# Patient Record
Sex: FEMALE | Race: BLACK OR AFRICAN AMERICAN | NOT HISPANIC OR LATINO | Employment: UNEMPLOYED | URBAN - METROPOLITAN AREA
[De-identification: names, ages, dates, MRNs, and addresses within clinical notes are randomized per-mention and may not be internally consistent; named-entity substitution may affect disease eponyms.]

---

## 2022-11-28 ENCOUNTER — HOSPITAL ENCOUNTER (OUTPATIENT)
Dept: DATA CONVERSION | Facility: HOSPITAL | Age: 27
End: 2022-11-28
Attending: STUDENT IN AN ORGANIZED HEALTH CARE EDUCATION/TRAINING PROGRAM

## 2022-11-28 DIAGNOSIS — Z34.80 ENCOUNTER FOR SUPERVISION OF OTHER NORMAL PREGNANCY, UNSPECIFIED TRIMESTER (HHS-HCC): ICD-10-CM

## 2022-11-28 DIAGNOSIS — Z3A.32 32 WEEKS GESTATION OF PREGNANCY (HHS-HCC): ICD-10-CM

## 2022-11-28 DIAGNOSIS — L73.2 HIDRADENITIS SUPPURATIVA: ICD-10-CM

## 2022-11-28 DIAGNOSIS — O99.891 OTHER SPECIFIED DISEASES AND CONDITIONS COMPLICATING PREGNANCY (HHS-HCC): ICD-10-CM

## 2023-03-01 NOTE — PROGRESS NOTES
Current Stage:   Stage: Triage     OB Dating:   EDC/EGA:  ·  EGA 30.3     Subjective Data:   Antepartum:  Vaginal Bleeding: No   Contractions/Abdominal Pain: No   Discharge/Loss of Fluid: No   Fetal Movement: Good   Fevers/Chills: No   Preeclampsia Symptoms: No   Antepartum:    Imani presents today with worsening pain from a HS lesion on her left labia.  She reports a longstanding history of this and is followed by Dr. Tillman in derm  She denies any obstetrical complaints today.  Reports that the lesion has got more painful despite using warm compresses, lidocaine gel in the area.      Objective Information:    Objective Information:      T   P  R  BP   MAP  SpO2   Value  36.8  85  16  118/68   86  97%  Date/Time  11:41  12:36  11:41  11:41   11:41  12:36  Range  (36.8C - 36.8C )  (85 - 104 )  (16 - 16 )  (118 - 118 )/ (68 - 68 )  (86 - 86 )  (97% - 100% )      Pain reported at  12:17: 10 = Severe      Physical Exam:   Constitutional: alert, oriented   Obstetric:  MOD variability + accels, neg  decels  toco - quiet  left labia with tender swollen area approx 3-4cm round.  No exudate.  Old HS lesion present and healing well bilateral labia   Skin: no rashes or lesions      Testing:   NST Interpretation - Baby A:  ·  Baseline    ·  Variability moderate (amplitude range 6 to 25 bpm)   ·  Interpretation Appropriate for EGA (2 10x10 accels)   ·  Accelerations yes (appropriate)   ·  Decelerations none     Assessment and Plan:   Assessment:    #1 IUP @ 32 weeks       Reactive NST    #2 HS       MD to evaluate and decide between incising on L&D or sending to derm for follow up    GARCIA Felix      Electronic Signatures:  Maya Felix (LAURITA-ODESSA)  (Signed 2022 13:15)   Authored: Current Stage, OB Dating, Subjective Data,  Objective Data,  Testing, Assessment and Plan, Note Completion      Last Updated: 2022 13:15 by Maya Felix  (APRN-CNM)

## 2023-08-16 LAB
HEPATITIS B VIRUS SURFACE AG PRESENCE IN SERUM: NONREACTIVE
HEPATITIS C VIRUS AB PRESENCE IN SERUM: NONREACTIVE
HIV 1/ 2 AG/AB SCREEN: NONREACTIVE
SYPHILIS TOTAL AB: NONREACTIVE

## 2023-08-17 LAB
CHLAMYDIA TRACH., AMPLIFIED: NEGATIVE
CLUE CELLS: ABNORMAL
N. GONORRHEA, AMPLIFIED: NEGATIVE
NUGENT SCORE: 7
TRICHOMONAS VAGINALIS: POSITIVE
YEAST: ABNORMAL

## 2023-09-06 VITALS — HEIGHT: 65 IN | WEIGHT: 189.6 LBS | BODY MASS INDEX: 31.59 KG/M2

## 2023-10-14 ENCOUNTER — HOSPITAL ENCOUNTER (EMERGENCY)
Facility: HOSPITAL | Age: 28
Discharge: HOME | End: 2023-10-14
Attending: EMERGENCY MEDICINE
Payer: COMMERCIAL

## 2023-10-14 VITALS
RESPIRATION RATE: 18 BRPM | SYSTOLIC BLOOD PRESSURE: 108 MMHG | BODY MASS INDEX: 34.49 KG/M2 | TEMPERATURE: 97.9 F | HEART RATE: 75 BPM | WEIGHT: 207 LBS | DIASTOLIC BLOOD PRESSURE: 64 MMHG | OXYGEN SATURATION: 99 % | HEIGHT: 65 IN

## 2023-10-14 DIAGNOSIS — L03.115 CELLULITIS OF RIGHT LOWER EXTREMITY: Primary | ICD-10-CM

## 2023-10-14 PROCEDURE — 2500000004 HC RX 250 GENERAL PHARMACY W/ HCPCS (ALT 636 FOR OP/ED): Mod: SE

## 2023-10-14 PROCEDURE — 99283 EMERGENCY DEPT VISIT LOW MDM: CPT

## 2023-10-14 PROCEDURE — 99283 EMERGENCY DEPT VISIT LOW MDM: CPT | Performed by: EMERGENCY MEDICINE

## 2023-10-14 RX ORDER — SULFAMETHOXAZOLE AND TRIMETHOPRIM 800; 160 MG/1; MG/1
1 TABLET ORAL 2 TIMES DAILY
Qty: 28 TABLET | Refills: 0 | Status: SHIPPED | OUTPATIENT
Start: 2023-10-14 | End: 2023-10-28

## 2023-10-14 RX ORDER — SULFAMETHOXAZOLE AND TRIMETHOPRIM 800; 160 MG/1; MG/1
1 TABLET ORAL ONCE
Status: COMPLETED | OUTPATIENT
Start: 2023-10-14 | End: 2023-10-14

## 2023-10-14 RX ORDER — ACETAMINOPHEN 325 MG/1
650 TABLET ORAL ONCE
Status: COMPLETED | OUTPATIENT
Start: 2023-10-14 | End: 2023-10-14

## 2023-10-14 RX ADMIN — ACETAMINOPHEN 650 MG: 325 TABLET ORAL at 14:48

## 2023-10-14 RX ADMIN — SULFAMETHOXAZOLE AND TRIMETHOPRIM 1 TABLET: 800; 160 TABLET ORAL at 14:48

## 2023-10-14 ASSESSMENT — PAIN SCALES - GENERAL: PAINLEVEL_OUTOF10: 9

## 2023-10-14 ASSESSMENT — LIFESTYLE VARIABLES
HAVE YOU EVER FELT YOU SHOULD CUT DOWN ON YOUR DRINKING: NO
REASON UNABLE TO ASSESS: NO
EVER HAD A DRINK FIRST THING IN THE MORNING TO STEADY YOUR NERVES TO GET RID OF A HANGOVER: NO
HAVE PEOPLE ANNOYED YOU BY CRITICIZING YOUR DRINKING: NO
EVER FELT BAD OR GUILTY ABOUT YOUR DRINKING: NO

## 2023-10-14 ASSESSMENT — COLUMBIA-SUICIDE SEVERITY RATING SCALE - C-SSRS
2. HAVE YOU ACTUALLY HAD ANY THOUGHTS OF KILLING YOURSELF?: NO
1. IN THE PAST MONTH, HAVE YOU WISHED YOU WERE DEAD OR WISHED YOU COULD GO TO SLEEP AND NOT WAKE UP?: NO
6. HAVE YOU EVER DONE ANYTHING, STARTED TO DO ANYTHING, OR PREPARED TO DO ANYTHING TO END YOUR LIFE?: NO

## 2023-10-14 ASSESSMENT — PAIN - FUNCTIONAL ASSESSMENT: PAIN_FUNCTIONAL_ASSESSMENT: 0-10

## 2023-10-14 ASSESSMENT — PAIN DESCRIPTION - PROGRESSION: CLINICAL_PROGRESSION: NOT CHANGED

## 2023-10-14 NOTE — DISCHARGE INSTRUCTIONS
May apply warm compresses to the area.  Follow-up with dermatology [phone] in 48-72 hours.   Continue Ibuprofen/ tylenol for discomfort.

## 2023-10-14 NOTE — ED PROVIDER NOTES
Emergency Department Encounter  [facility] EMERGENCY MEDICINE    Patient: Imani Marcial  MRN: [id number]  : 1995  Date of Evaluation: 10/14/2023  ED Provider: BUCK Adams      Chief Complaint       Chief Complaint   Patient presents with    Abscess     [place]    (Location/Symptom, Timing/Onset, Context/Setting, Quality, Duration, Modifying Factors, Severity) Note limiting factors.   Limitations to History: None  Historian: Patient  Records reviewed: EMR inpatient and outpatient notes, Care Everywhere      Imani Marcial is a 28 y.o. female who presents to the emergency department complaining of boil to right inner thigh for 3 days.  She reports history of hidradenitis.  She reports minimal relief with ibuprofen 800 mg.  She denies fever, chills, body aches, chest pain, drainage, discoloration in skin, numbness or tingling.    ROS:     Review of Systems  14 systems reviewed and otherwise acutely negative except as in the [place].          Past History     Past Medical History:   Diagnosis Date    Encounter for immunization 2022    Need for vaccination    Encounter for screening for malignant neoplasm of cervix 10/29/2018    Cervical cancer screening    Other conditions influencing health status 2022    History of pregnancy    Other specified noninflammatory disorders of vagina 2021    Vaginal discharge     History reviewed. No pertinent surgical history.  Social History     Socioeconomic History    Marital status: Single     Spouse name: None    Number of children: None    Years of education: None    Highest education level: None   Occupational History    None   Tobacco Use    Smoking status: None    Smokeless tobacco: None   Substance and Sexual Activity    Alcohol use: None    Drug use: None    Sexual activity: None   Other Topics Concern    None   Social History Narrative    None     Social Determinants of Health     Financial Resource Strain: Not on file   Food  Insecurity: Not on file   Transportation Needs: Not on file   Physical Activity: Not on file   Stress: Not on file   Social Connections: Not on file   Intimate Partner Violence: Not on file   Housing Stability: Not on file       Medications/Allergies     Previous Medications    No medications on file     No Known Allergies     Physical Exam       ED Triage Vitals [10/14/23 1245]   Temp Heart Rate Resp BP   36.6 °C (97.9 °F) 75 18 108/64      SpO2 Temp Source Heart Rate Source Patient Position   99 % Temporal -- Sitting      BP Location FiO2 (%)     Right arm --         Physical Exam  Vitals and nursing note reviewed.   Constitutional:       Appearance: Normal appearance.   HENT:      Head: Normocephalic.      Right Ear: External ear normal.      Left Ear: External ear normal.      Nose: Nose normal.   Eyes:      Extraocular Movements: Extraocular movements intact.      Pupils: Pupils are equal, round, and reactive to light.   Cardiovascular:      Rate and Rhythm: Normal rate and regular rhythm.      Heart sounds: Normal heart sounds.   Pulmonary:      Effort: Pulmonary effort is normal.      Breath sounds: Normal breath sounds.   Abdominal:      General: Abdomen is flat. Bowel sounds are normal.      Palpations: Abdomen is soft.   Musculoskeletal:         General: Normal range of motion.      Cervical back: Normal range of motion and neck supple.   Skin:     Comments: Hardened nodule to right inner thigh. There is a moderate degree of tenderness, erythema and localized swelling to surrounding skin. No fluctuance. No tenderness extending beyond the area of erythema. No ascending lymphangitis. No other rash. No bullae or hemorrhagic bullae. No crepitance. No suggestion of necrotizing soft tissue infection such as necrotizing fasciitis. No ecchymosis.       Neurological:      General: No focal deficit present.      Mental Status: She is alert and oriented to person, place, and time.   Psychiatric:         Mood and  "Affect: Mood normal.         Behavior: Behavior normal.           Diagnostics   Labs:  Labs Reviewed - No data to display  Radiographs:  No orders to display       Procedures: N/A      EKG: N/A  Assessment   In brief, Imani Marcial is a 28 y.o. female who presented to the emergency department for chief complaint of boil to right inner thigh for 3 days.  Vital signs reviewed and within normal limits.  Afebrile.  Hydrated and nontoxic appearing.  No acute distress noted. Physical exam shows hardened nodule to right inner thigh.  There is a moderate degree of tenderness, erythema and localized swelling to surrounding skin. No fluctuance. No tenderness extending beyond the area of erythema. No ascending lymphangitis. No other rash. No bullae or hemorrhagic bullae. No crepitance. No suggestion of necrotizing soft tissue infection such as necrotizing fasciitis. No ecchymosis.  Differential diagnosis includes cellulitis, abscess, folliculitis.  Case reviewed with Dr. Carlos Sharma.  No evidence of necrotizing fasciitis, abscess or sepsis.  Will treat for cellulitis.  Bactrim and acetaminophen administered.  Home-going with Bactrim.  Patient educated to continue continue Bactrim and supportive care measures.  Patient educated on ibuprofen/Tylenol for discomfort.  May apply warm compresses to the affected area.  Patient educated to follow-up with dermatology as needed.  Patient verbalized understanding, agreed with plan of care and left in stable condition.    Plan   Cellulitis    Differentials   Cellulitis  Abscess  Folliculitis  Necrotizing fasciitis    ED Course   Visit Vitals  /64 (BP Location: Right arm, Patient Position: Sitting)   Pulse 75   Temp 36.6 °C (97.9 °F) (Temporal)   Resp 18   Ht 1.651 m (5' 5\")   Wt 93.9 kg (207 lb)   SpO2 99%   BMI 34.45 kg/m²   BSA 2.08 m²       Medications - No data to display    Plan of care discussed with patient      Final Impression    No diagnosis found.  "     DISPOSITION  Disposition: Discharge  Patient condition is: Stable    Comment: Please note this report has been produced using speech recognition software and may contain errors related to that system including errors in grammar, punctuation, and spelling, as well as words and phrases that may be inappropriate.  If there are any questions or concerns please feel free to contact the dictating provider for clarification.    LAURITA Adams-BUCK Jaramillo  10/14/23 5736

## 2024-03-10 ENCOUNTER — HOSPITAL ENCOUNTER (EMERGENCY)
Facility: HOSPITAL | Age: 29
Discharge: HOME | End: 2024-03-10
Attending: EMERGENCY MEDICINE
Payer: COMMERCIAL

## 2024-03-10 ENCOUNTER — APPOINTMENT (OUTPATIENT)
Dept: RADIOLOGY | Facility: HOSPITAL | Age: 29
End: 2024-03-10
Payer: COMMERCIAL

## 2024-03-10 VITALS
OXYGEN SATURATION: 96 % | SYSTOLIC BLOOD PRESSURE: 114 MMHG | HEIGHT: 65 IN | HEART RATE: 109 BPM | WEIGHT: 190 LBS | TEMPERATURE: 98.1 F | DIASTOLIC BLOOD PRESSURE: 64 MMHG | RESPIRATION RATE: 16 BRPM | BODY MASS INDEX: 31.65 KG/M2

## 2024-03-10 DIAGNOSIS — L73.2 HIDRADENITIS SUPPURATIVA OF ANUS: Primary | ICD-10-CM

## 2024-03-10 LAB
ANION GAP SERPL CALC-SCNC: 15 MMOL/L (ref 10–20)
APPEARANCE UR: CLEAR
BILIRUB UR STRIP.AUTO-MCNC: NEGATIVE MG/DL
BUN SERPL-MCNC: 10 MG/DL (ref 6–23)
CALCIUM SERPL-MCNC: 9.7 MG/DL (ref 8.6–10.6)
CHLORIDE SERPL-SCNC: 103 MMOL/L (ref 98–107)
CO2 SERPL-SCNC: 26 MMOL/L (ref 21–32)
COLOR UR: YELLOW
CREAT SERPL-MCNC: 0.75 MG/DL (ref 0.5–1.05)
EGFRCR SERPLBLD CKD-EPI 2021: >90 ML/MIN/1.73M*2
GLUCOSE SERPL-MCNC: 97 MG/DL (ref 74–99)
GLUCOSE UR STRIP.AUTO-MCNC: NORMAL MG/DL
HOLD SPECIMEN: NORMAL
KETONES UR STRIP.AUTO-MCNC: NEGATIVE MG/DL
LEUKOCYTE ESTERASE UR QL STRIP.AUTO: NEGATIVE
MUCOUS THREADS #/AREA URNS AUTO: ABNORMAL /LPF
NITRITE UR QL STRIP.AUTO: NEGATIVE
PH UR STRIP.AUTO: 6.5 [PH]
POTASSIUM SERPL-SCNC: 4 MMOL/L (ref 3.5–5.3)
PREGNANCY TEST URINE, POC: NEGATIVE
PROT UR STRIP.AUTO-MCNC: ABNORMAL MG/DL
RBC # UR STRIP.AUTO: ABNORMAL /UL
RBC #/AREA URNS AUTO: ABNORMAL /HPF
SODIUM SERPL-SCNC: 140 MMOL/L (ref 136–145)
SP GR UR STRIP.AUTO: 1.03
SQUAMOUS #/AREA URNS AUTO: ABNORMAL /HPF
UROBILINOGEN UR STRIP.AUTO-MCNC: NORMAL MG/DL
WBC #/AREA URNS AUTO: ABNORMAL /HPF

## 2024-03-10 PROCEDURE — 73080 X-RAY EXAM OF ELBOW: CPT | Mod: LT

## 2024-03-10 PROCEDURE — 99285 EMERGENCY DEPT VISIT HI MDM: CPT | Performed by: EMERGENCY MEDICINE

## 2024-03-10 PROCEDURE — 73060 X-RAY EXAM OF HUMERUS: CPT | Mod: LT

## 2024-03-10 PROCEDURE — 70486 CT MAXILLOFACIAL W/O DYE: CPT | Performed by: RADIOLOGY

## 2024-03-10 PROCEDURE — 70498 CT ANGIOGRAPHY NECK: CPT

## 2024-03-10 PROCEDURE — 76377 3D RENDER W/INTRP POSTPROCES: CPT | Mod: CCI

## 2024-03-10 PROCEDURE — 81001 URINALYSIS AUTO W/SCOPE: CPT | Performed by: EMERGENCY MEDICINE

## 2024-03-10 PROCEDURE — 73130 X-RAY EXAM OF HAND: CPT | Mod: LEFT SIDE | Performed by: INTERNAL MEDICINE

## 2024-03-10 PROCEDURE — 99285 EMERGENCY DEPT VISIT HI MDM: CPT | Mod: 25

## 2024-03-10 PROCEDURE — 73060 X-RAY EXAM OF HUMERUS: CPT | Mod: LEFT SIDE | Performed by: INTERNAL MEDICINE

## 2024-03-10 PROCEDURE — 70486 CT MAXILLOFACIAL W/O DYE: CPT

## 2024-03-10 PROCEDURE — 73110 X-RAY EXAM OF WRIST: CPT | Mod: LT

## 2024-03-10 PROCEDURE — 2500000001 HC RX 250 WO HCPCS SELF ADMINISTERED DRUGS (ALT 637 FOR MEDICARE OP): Mod: SE

## 2024-03-10 PROCEDURE — 73110 X-RAY EXAM OF WRIST: CPT | Mod: LEFT SIDE | Performed by: INTERNAL MEDICINE

## 2024-03-10 PROCEDURE — 2550000001 HC RX 255 CONTRASTS: Mod: SE | Performed by: EMERGENCY MEDICINE

## 2024-03-10 PROCEDURE — 82374 ASSAY BLOOD CARBON DIOXIDE: CPT | Performed by: EMERGENCY MEDICINE

## 2024-03-10 PROCEDURE — 73130 X-RAY EXAM OF HAND: CPT | Mod: LT

## 2024-03-10 PROCEDURE — 81025 URINE PREGNANCY TEST: CPT

## 2024-03-10 PROCEDURE — 36415 COLL VENOUS BLD VENIPUNCTURE: CPT | Performed by: EMERGENCY MEDICINE

## 2024-03-10 PROCEDURE — 73080 X-RAY EXAM OF ELBOW: CPT | Mod: LEFT SIDE | Performed by: INTERNAL MEDICINE

## 2024-03-10 RX ORDER — DOXYCYCLINE HYCLATE 100 MG
100 TABLET ORAL ONCE
Status: COMPLETED | OUTPATIENT
Start: 2024-03-10 | End: 2024-03-10

## 2024-03-10 RX ORDER — DOXYCYCLINE 100 MG/1
100 TABLET ORAL 2 TIMES DAILY
Qty: 60 TABLET | Refills: 0 | Status: SHIPPED | OUTPATIENT
Start: 2024-03-10 | End: 2024-03-25 | Stop reason: ALTCHOICE

## 2024-03-10 RX ORDER — ACETAMINOPHEN 325 MG/1
975 TABLET ORAL ONCE
Status: COMPLETED | OUTPATIENT
Start: 2024-03-10 | End: 2024-03-10

## 2024-03-10 RX ADMIN — IOHEXOL 75 ML: 350 INJECTION, SOLUTION INTRAVENOUS at 13:27

## 2024-03-10 RX ADMIN — ACETAMINOPHEN 975 MG: 325 TABLET ORAL at 11:49

## 2024-03-10 RX ADMIN — DOXYCYCLINE HYCLATE 100 MG: 100 TABLET, COATED ORAL at 11:49
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
## 2024-03-10 NOTE — DISCHARGE INSTRUCTIONS
We strongly recommend following up with [facility] and they are always available in the ED if you have any similar concerns in the future.  Otherwise we recommend prophylactic antibiotics for concern for hidradenitis and would recommend taking the doxycycline as prescribed until you follow-up with your primary care provider.  If swelling continues, you develop fevers, chills, generalized infection or the pain becomes unbearable these would all be reasons to return to ED for consideration of drainage.  Otherwise we do not routinely recommend drainage of hidradenitis as it causes increased rates of recurrence.  We would recommend following with dermatology for further management of hidradenitis as they have options such as local steroids, wide excisions and antiandrogens that are sometimes helpful in the treatment.  You otherwise do not have any evidence of acute traumatic injuries on imaging today.  Does not mean they were on the [place] pain and would recommend treatment at home with acetaminophen, ibuprofen, rest and gentle use.  Please return if you develop new symptoms.

## 2024-03-14 ENCOUNTER — OFFICE VISIT (OUTPATIENT)
Dept: PRIMARY CARE | Facility: CLINIC | Age: 29
End: 2024-03-14
Payer: COMMERCIAL

## 2024-03-14 VITALS
OXYGEN SATURATION: 97 % | HEART RATE: 85 BPM | BODY MASS INDEX: 32.01 KG/M2 | TEMPERATURE: 97.3 F | HEIGHT: 65 IN | DIASTOLIC BLOOD PRESSURE: 65 MMHG | SYSTOLIC BLOOD PRESSURE: 103 MMHG | WEIGHT: 192.1 LBS

## 2024-03-14 DIAGNOSIS — F17.210 CIGARETTE NICOTINE DEPENDENCE WITHOUT COMPLICATION: Primary | ICD-10-CM

## 2024-03-14 DIAGNOSIS — Z30.019 ENCOUNTER FOR FEMALE BIRTH CONTROL: ICD-10-CM

## 2024-03-14 DIAGNOSIS — G47.33 OBSTRUCTIVE SLEEP APNEA OF ADULT: ICD-10-CM

## 2024-03-14 DIAGNOSIS — Z23 ENCOUNTER FOR IMMUNIZATION: ICD-10-CM

## 2024-03-14 PROBLEM — L73.2 HIDRADENITIS SUPPURATIVA: Status: ACTIVE | Noted: 2018-11-07

## 2024-03-14 PROBLEM — F41.8 DEPRESSION WITH ANXIETY: Status: ACTIVE | Noted: 2024-03-14

## 2024-03-14 PROBLEM — E55.9 VITAMIN D DEFICIENCY: Status: ACTIVE | Noted: 2024-03-14

## 2024-03-14 PROCEDURE — 90471 IMMUNIZATION ADMIN: CPT

## 2024-03-14 PROCEDURE — 4004F PT TOBACCO SCREEN RCVD TLK: CPT

## 2024-03-14 PROCEDURE — 90677 PCV20 VACCINE IM: CPT

## 2024-03-14 PROCEDURE — 99214 OFFICE O/P EST MOD 30 MIN: CPT

## 2024-03-14 RX ORDER — BENZOYL PEROXIDE 100 MG/ML
1 LIQUID TOPICAL DAILY
COMMUNITY

## 2024-03-14 RX ORDER — MICONAZOLE NITRATE 2 %
2 CREAM (GRAM) TOPICAL AS NEEDED
Qty: 100 EACH | Refills: 0 | Status: SHIPPED | OUTPATIENT
Start: 2024-03-14 | End: 2024-04-13

## 2024-03-14 RX ORDER — NORELGESTROMIN AND ETHINYL ESTRADIOL 150; 35 UG/D; UG/D
1 PATCH TRANSDERMAL
Qty: 4 PATCH | Refills: 2 | Status: SHIPPED | OUTPATIENT
Start: 2024-03-14 | End: 2024-06-12

## 2024-03-14 RX ORDER — BUPROPION HYDROCHLORIDE 150 MG/1
150 TABLET ORAL
COMMUNITY
Start: 2024-02-19

## 2024-03-14 RX ORDER — GENTAMICIN SULFATE 1 MG/G
1 OINTMENT TOPICAL 2 TIMES DAILY
COMMUNITY
Start: 2024-02-15

## 2024-03-14 RX ORDER — NORELGESTROMIN AND ETHINYL ESTRADIOL 150; 35 UG/D; UG/D
1 PATCH TRANSDERMAL
COMMUNITY
Start: 2013-01-03 | End: 2024-03-14 | Stop reason: SDUPTHER

## 2024-03-14 ASSESSMENT — PAIN SCALES - GENERAL: PAINLEVEL: 0-NO PAIN
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
## 2024-03-16 ASSESSMENT — ENCOUNTER SYMPTOMS
NAUSEA: 0
DYSURIA: 0
PALPITATIONS: 0
VOMITING: 0
FATIGUE: 1
CHEST TIGHTNESS: 1
DIARRHEA: 0
SLEEP DISTURBANCE: 1
CONSTIPATION: 0
MUSCULOSKELETAL NEGATIVE: 1
HEADACHES: 1
TROUBLE SWALLOWING: 0
FEVER: 0
INSOMNIA: 1
WHEEZING: 0
CHILLS: 0
DIAPHORESIS: 0
COUGH: 0
UNEXPECTED WEIGHT CHANGE: 0

## 2024-03-18 NOTE — PROGRESS NOTES
I saw and evaluated the patient. I personally obtained the key and critical portions of the history and physical exam or was physically present for key and critical portions performed by the resident/fellow. I reviewed the resident/fellow's documentation and discussed the patient with the resident/fellow. I agree with the resident/fellow's medical decision making as documented in the note.    Danny Guillen MD

## 2024-03-24 NOTE — PROGRESS NOTES
Patient: Imani Marcial    [id number]  : 1995 -- AGE 28 y.o.    Provider: Rod Hall MD     Location [facility]   Service Date: 3/24/2024              [facility]  New Visit Note      Virtual or Telephone Consent  An interactive audio and video telecommunication system which permits real time communications between the patient (at the originating site) and provider (at the distant site) was utilized to provide this telehealth service.   Verbal consent was requested and obtained from Imani Marcial on this date, 24 for a telehealth visit.   I verified the patient's identity and physical location in [place].  If this is a new patient to me, I informed the patient of my name and type of active [place] license that I hold.      The patient's referring provider is: Danny Guillen MD     HPI:  Imani Marcial is a 28 y.o. female with PMH notable for mild DIANNE with prior CPAP usage, insomnia, hidradenitis, nicotine dependence, vitamin D deficiency, and anxiety/depression, who presents today due to persistent sleep complaints and wants to try CPAP again.    She was initially diagnosed with DIANNE in 2018 and used CPAP, but stopped because it was uncomfortable - was bothered by having to take it off when she got up to urinate, used a full face mask, and now she would like to try it again. Her sleep-related symptoms include snoring, interrupted sleep and morning headaches.    Prior sleep testing in 2018 (see below) showed mild DIANNE on PSG and MSLT showed pathologic sleepiness with mean sleep latency of 6.4 minutes, but no SOREMs and urine drug screen was presumptive positive for cannabinoids.    Today, she reports she continues to have daytime sleepiness, non-restorative sleep, and snoring. None are worse over time. Snoring is loud, nightly, no exacerbating or relieving factors, started years ago.    Saw ENT for nasal congestion, tonsillitis, has nasal septum deviation, and  "was advised to use Flonase and Astelin.    NIGHTTIME SYMPTOMS:   Snoring: as above  Witnessed apnea: Yes  Nocturnal gasping: Yes/snort arousal  Sleep walking: No  Sleep talking:  No  Dream enactment: No  Morning headaches: Yes  Nocturia: 2-3 times/night, has always done this  Sleep paralysis: in the past, not for years  Hypnagogic/hypnopompic hallucinations: No  Bedroom environment is conducive to sleep: Yes    DAYTIME SYMPTOMS  [place]: 9/24 [0, 3, 0, 3, 3, 0, 0, 0]  Daytime sleepiness: \"always tired\", feels alert, but can easily sleep when inactive and allows herself to doze  Fatigue: Yes  Trouble with memory/concentration:  \"a little forgetful\"  Dozing: generally no  Feeling sleepy while driving: Denies    RLS symptoms: No   Cataplexy: No    SLEEP HABITS:   Preferred sleep position: lateral or prone  Bedtime: 10-11 pm, as late as midnight, sleep latency within a few minutes  Wake time: 5-530 am on work days, otherwise around 9 am  # of nocturnal awakenings: 3-4 due to spontaneous awakenings and nocturia  Napping: \"if I can\" - naps approximately 1-2 times per week in the afternoon after work for about an hour. Napping is not refreshing.  Total estimated sleep per 24 hrs: 5-9 hours    PRIOR SLEEP STUDIES:  -PSG 12/15/2019: Weight 70.0 kg (BMI 25.1). Pt's complainst were hypersomnia, snoring and witnessed apneas. Study showed mild DIANNE with AHI 7.2 due to 42 hypopneas, 2 obstructive apneas 1 central apnea.  REM AHI 1.6, supine AHI 0.  SpO2 chavez 51%.  0 PLMS. Sleep onset latency 14.4 minutes, REM latency 71.5 minutes.  minutes. Frequent sleep disruption, see hypnogram below. Study was followed by an MSLT.    -MSLT 12/16/2019: Mean sleep latency 6.4 minutes (6.8, 7.8, 4.6, 3.0, and 9.9 minutes in naps 1-5, respectively) with no SOREMs. Urine drug screen was presumptive positive for cannabinoids.    PRIOR TREATMENTS:  No stimulants or sleep aids.    Patient Active Problem List   Diagnosis    Depression with " "anxiety    Hidradenitis suppurativa    Obstructive sleep apnea of adult    Vitamin D deficiency    Cigarette nicotine dependence without complication     Past Medical History:   Diagnosis Date    Encounter for immunization 11/02/2022    Need for vaccination    Encounter for screening for malignant neoplasm of cervix 10/29/2018    Cervical cancer screening    Other conditions influencing health status 06/13/2022    History of pregnancy    Other specified noninflammatory disorders of vagina 09/14/2021    Vaginal discharge     No past surgical history on file.  Current Outpatient Medications   Medication Sig Dispense Refill    benzoyl peroxide (Benzac AC) 10 % external wash Apply 1 g topically once daily.      buPROPion XL (Wellbutrin XL) 150 mg 24 hr tablet Take 1 tablet (150 mg) by mouth once daily in the morning. Take before meals.      doxycycline (Adoxa) 100 mg tablet Take 1 tablet (100 mg) by mouth 2 times a day. Take with a full glass of water and do not lie down for at least 30 minutes after 60 tablet 0    gentamicin (Garamycin) 0.1 % ointment Apply 1 Application topically 2 times a day.      nicotine polacrilex (Nicorette) 2 mg gum Chew 1 each (2 mg) if needed for smoking cessation. 100 each 0    Xulane 150-35 mcg/24 hr Place 1 patch on the skin 1 (one) time per week. 4 patch 2     No current facility-administered medications for this visit.     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 Gets nauseous when smokes.  Illicits: was daily, now every couple of days, working on cutting back.  Caffeine: 1 serving of coffee a few times per week     ROS: 12 point ROS positive for fatigue, always \"kind of " "stuffy\" - nasal congestion - is getting her daughter's cold, has a spray that she can use, but does not like using it; does not think it is allergies; saw ENT previously. Positive for mild chest pain with exertion, headaches, depression/anxiety (No HI/SI). Positive for a draining cyst in low back. No blurred vision, SOB, heartburn, neck masses.    PHYSICAL EXAMINATION:   General: Awake. Alert. Comfortable. No apparent distress.   Speech: Normal  Comprehension: Normal  Mood: Stable  Affect: Appropriate  Eyes:   Eyelids: normal            ENT:          Unable to assess patency of nasal passageways or for presence of nasal septum deviation. Tongue scalloping is present, tongue is enlarged, soft palate is not elongated, hard palate is not high arched. Uvula is not enlarged. Retrognathia is not present. Tonsils are  3+ . Dentition good.           Neck:          Circumference: mildly enlarged in caliber  Cardiac:  unable to assess pulses or cardiac rate/rhythm.  Pul:          Normal respiratory effort   Abd:         increased central adiposity  Neuro: Alert, well-oriented. Cranial nerves II-XII grossly normal and symmetric. No abnormal movements noted.         LABS/DIAGNOSTICS:  Lab Results   Component Value Date    HGB 11.5 (L) 01/05/2023    CO2 26 03/10/2024    TSH 1.82 02/01/2019    HGBA1C 5.2 11/01/2018    VITD25 6 (A) 11/01/2018        Echo: none on file    PFTs: none on file      ASSESSMENT AND PLAN: Ms. Imani Marcial is a 28 y.o. female with a history of mild DIANNE with pathologic sleepiness on MSLT (2018) with urine drug screen at the time being presumptive positive for cannabinoids, who tried CPAP but did not like it due to the full face mask and taking it off in the night due to frequent nocturia, and she has persistent sleep-related symptoms and she would like to have her sleep apnea treated.      #DIANNE  -We discussed the risk factors for sleep apnea, pathophysiology of sleep apnea, treatment options, and " potential long-term complications of untreated DIANNE, including cardiovascular and metabolic complications. We will start re-evaluation with a split night in-lab sleep study. She would like to try instead a nasal pillows mask    #tonsillar enlargement  -consider ENT referral for surgical management if pt non-surgical treatments for sleep apnea are ineffective    #nasal congestion  -advised pt to use her Flonase and Astelin as prescribed    #cigarette nicotine dependence without complication  -advised pt to continue working on quitting smoking    #Chest pain on exertion  -advised pt to discuss with PCP    #marijuana use  -advised pt to continue working on cutting back     All of the above was discussed with the patient in detail. She voiced an understanding of the above and was agreeable to proceed further as advised. Procedure for the sleep study was discussed with her.    Around  50 minutes  were spent on this encounter, including time reviewing the chart, conducting the H&P, counseling the patient, and documenting/placing orders.    FOLLOW UP:  After study to discuss results

## 2024-03-25 ENCOUNTER — OFFICE VISIT (OUTPATIENT)
Dept: SLEEP MEDICINE | Facility: CLINIC | Age: 29
End: 2024-03-25
Payer: COMMERCIAL

## 2024-03-25 DIAGNOSIS — J35.1 TONSILLAR ENLARGEMENT: ICD-10-CM

## 2024-03-25 DIAGNOSIS — F12.90 MARIJUANA USE: ICD-10-CM

## 2024-03-25 DIAGNOSIS — F17.210 CIGARETTE NICOTINE DEPENDENCE WITHOUT COMPLICATION: ICD-10-CM

## 2024-03-25 DIAGNOSIS — R07.9 CHEST PAIN ON EXERTION: ICD-10-CM

## 2024-03-25 DIAGNOSIS — R09.81 CHRONIC NASAL CONGESTION: ICD-10-CM

## 2024-03-25 DIAGNOSIS — G47.33 OSA (OBSTRUCTIVE SLEEP APNEA): Primary | ICD-10-CM

## 2024-03-25 PROCEDURE — 99204 OFFICE O/P NEW MOD 45 MIN: CPT | Performed by: PSYCHIATRY & NEUROLOGY

## 2024-03-25 PROCEDURE — 4004F PT TOBACCO SCREEN RCVD TLK: CPT | Performed by: PSYCHIATRY & NEUROLOGY

## 2024-03-25 RX ORDER — SULFAMETHOXAZOLE AND TRIMETHOPRIM 800; 160 MG/1; MG/1
1 TABLET ORAL 2 TIMES DAILY
COMMUNITY
Start: 2024-03-20 | End: 2024-05-16 | Stop reason: ALTCHOICE

## 2024-04-08 ENCOUNTER — APPOINTMENT (OUTPATIENT)
Dept: SLEEP MEDICINE | Facility: CLINIC | Age: 29
End: 2024-04-08
Payer: COMMERCIAL

## 2024-04-28 ENCOUNTER — CLINICAL SUPPORT (OUTPATIENT)
Dept: SLEEP MEDICINE | Facility: CLINIC | Age: 29
End: 2024-04-28
Payer: COMMERCIAL

## 2024-04-28 DIAGNOSIS — G47.33 OSA (OBSTRUCTIVE SLEEP APNEA): ICD-10-CM

## 2024-04-28 PROCEDURE — 95810 POLYSOM 6/> YRS 4/> PARAM: CPT | Performed by: PSYCHIATRY & NEUROLOGY

## 2024-04-29 VITALS
RESPIRATION RATE: 16 BRPM | OXYGEN SATURATION: 99 % | DIASTOLIC BLOOD PRESSURE: 55 MMHG | SYSTOLIC BLOOD PRESSURE: 100 MMHG | HEART RATE: 73 BPM

## 2024-04-29 ASSESSMENT — SLEEP AND FATIGUE QUESTIONNAIRES
SITING INACTIVE IN A PUBLIC PLACE LIKE A CLASS ROOM OR A MOVIE THEATER: SLIGHT CHANCE OF DOZING
HOW LIKELY ARE YOU TO NOD OFF OR FALL ASLEEP WHEN YOU ARE A PASSENGER IN A CAR FOR AN HOUR WITHOUT A BREAK: HIGH CHANCE OF DOZING
HOW LIKELY ARE YOU TO NOD OFF OR FALL ASLEEP WHILE SITTING AND TALKING TO SOMEONE: SLIGHT CHANCE OF DOZING
HOW LIKELY ARE YOU TO NOD OFF OR FALL ASLEEP IN A CAR, WHILE STOPPED FOR A FEW MINUTES IN TRAFFIC: WOULD NEVER DOZE
HOW LIKELY ARE YOU TO NOD OFF OR FALL ASLEEP WHILE SITTING QUIETLY AFTER LUNCH WITHOUT ALCOHOL: SLIGHT CHANCE OF DOZING
HOW LIKELY ARE YOU TO NOD OFF OR FALL ASLEEP WHILE SITTING AND READING: HIGH CHANCE OF DOZING
ESS-CHAD TOTAL SCORE: 15
HOW LIKELY ARE YOU TO NOD OFF OR FALL ASLEEP WHILE LYING DOWN TO REST IN THE AFTERNOON WHEN CIRCUMSTANCES PERMIT: HIGH CHANCE OF DOZING
HOW LIKELY ARE YOU TO NOD OFF OR FALL ASLEEP WHILE WATCHING TV: HIGH CHANCE OF DOZING

## 2024-04-29 NOTE — PROGRESS NOTES
[facility] TECH NOTE:     Patient: Imani Marcial   MRN//AGE: [id number]  1995  28 y.o.   Technologist: Lilian Bai RRT-SDS   Room: 107   Service Date: 2024        Sleep Testing Location: [facility]:     TECHNOLOGIST SLEEP STUDY PROCEDURE NOTE:   This sleep study is being conducted according to the policies and procedures outlined by the AAS accreditation standards.  The sleep study procedure and processes involved during this appointment was explained to the patient/patient’s family, questions were answered. The patient/family verbalized understanding.      The patient is a 28 y.o. year old female scheduled for a Diagnostic PSG Split night. she arrived for her appointment.      The study that was ultimately completed was a Diagnostic PSG .    The full study Was completed.  Patient questionnaires completed?: yes     Consents signed? yes    Initial Fall Risk Screening:     Imani has not fallen in the last 6 months. her did not result in injury. Imani does not have a fear of falling. He does not need assistance with sitting, standing, or walking. she does not need assistance walking in her home. she does not need assistance in an unfamiliar setting. The patient is notusing an assistive device.     Brief Study observations: Patient did not qualify for a Split study tonight. Patient had Arousals of Respiratory, Spontaneous and Limb movements. Patients  Respiratory events Hypopneas and Flow Limitations. Patient had intermittent, mild to moderate snoring. Patient was up 1 time throughout the night. Patient did go into REM.     Deviation to order/protocol and reason:       If PAP, which was preferred mask/pressure/mode:       Other:None    After the procedure, the patient/family was informed to ensure followup with ordering clinician for testing results.      Technologist: Lilian Bai RRT-SDS

## 2024-05-13 ENCOUNTER — APPOINTMENT (OUTPATIENT)
Dept: SLEEP MEDICINE | Facility: CLINIC | Age: 29
End: 2024-05-13
Payer: COMMERCIAL

## 2024-05-16 ENCOUNTER — OFFICE VISIT (OUTPATIENT)
Dept: SLEEP MEDICINE | Facility: CLINIC | Age: 29
End: 2024-05-16
Payer: COMMERCIAL

## 2024-05-16 DIAGNOSIS — F17.210 CIGARETTE NICOTINE DEPENDENCE WITHOUT COMPLICATION: ICD-10-CM

## 2024-05-16 DIAGNOSIS — G47.33 OSA (OBSTRUCTIVE SLEEP APNEA): Primary | ICD-10-CM

## 2024-05-16 DIAGNOSIS — R09.81 CHRONIC NASAL CONGESTION: ICD-10-CM

## 2024-05-16 PROCEDURE — 99214 OFFICE O/P EST MOD 30 MIN: CPT | Performed by: PSYCHIATRY & NEUROLOGY

## 2024-05-16 RX ORDER — FLUTICASONE PROPIONATE 50 MCG
1-2 SPRAY, SUSPENSION (ML) NASAL
COMMUNITY

## 2024-05-16 NOTE — PROGRESS NOTES
Patient: Imani Marcial    [id number]  : 1995 -- AGE 28 y.o.    Provider: Rod Hall MD     [facility]   Service Date: 2024              [facility]  Follow-up Note      Virtual or Telephone Consent  An interactive audio and video telecommunication system which permits real time communications between the patient (at the originating site) and provider (at the distant site) was utilized to provide this telehealth service.   Verbal consent was requested and obtained from Imani Marcial on this date, 24 for a telehealth visit.   I verified the patient's identity and physical location in [place].  If this is a new patient to me, I informed the patient of my name and type of active [place] license that I hold.          HPI: Imani Marcial is a 28 y.o. female with history of mild DIANNE with pathologic sleepiness on MSLT () with urine drug screen at the time being presumptive positive for cannabinoids, who tried CPAP but did not like it due to the full face mask and taking it off in the night due to frequent nocturia, and she has persistent sleep-related symptoms. She underwent repeat sleep testing and is here today for a follow up visit to review the results. Of note, she has tonsillar enlargement, nasal congestion, nasal septum deviation, and marijuana use several times per week, and PMH is otherwise notable for insomnia, hidradenitis, nicotine dependence, vitamin D deficiency, and anxiety/depression.    In lab PSG 2024: Study showed DIANNE that is moderate by AASM criteria and mild by CMS criteria.  See details below.    Reviewed test results in detail with the patient. She is very willing to try CPAP again, perhaps this time with a nasal style of mask, though she states she is a mouth-breather at night.    Has Flonase, but does not use it, though she will start because she has chronic nasal congestion. Cannot find her Astelin.    Prior Sleep  studies:   -PSG 12/15/2019: Weight 70.0 kg (BMI 25.1). Pt's complainst were hypersomnia, snoring and witnessed apneas. Study showed mild DIANNE with AHI 7.2 due to 42 hypopneas, 2 obstructive apneas 1 central apnea.  REM AHI 1.6, supine AHI 0.  SpO2 chavez 51%.  0 PLMS. Sleep onset latency 14.4 minutes, REM latency 71.5 minutes.  minutes. Frequent sleep disruption, see hypnogram below. Study was followed by an MSLT.    -MSLT 12/16/2019: Mean sleep latency 6.4 minutes (6.8, 7.8, 4.6, 3.0, and 9.9 minutes in naps 1-5, respectively) with no SOREMs. Urine drug screen was presumptive positive for cannabinoids  -PSG 4/20/2024: Weight 192 pounds.  BMI 32.0.  Moderate DIANNE by AASM criteria with RDI3% 22.5/h, mild DIANNE by CMS criteria with RDI4% 9.7/h.  Mean SpO2 during sleep 97%, chavez 85% with 0.9 minutes spent at or below 88%. Occasional brief bursts of loss of normal REM atonia in chin EMG channel, but no abnormal behavior.  Persistent airflow limitation throughout the study.  No PLMS.              Patient Active Problem List   Diagnosis    Depression with anxiety    Hidradenitis suppurativa    Obstructive sleep apnea of adult    Vitamin D deficiency    Cigarette nicotine dependence without complication     Past Medical History:   Diagnosis Date    Encounter for immunization 11/02/2022    Need for vaccination    Encounter for screening for malignant neoplasm of cervix 10/29/2018    Cervical cancer screening    Other conditions influencing health status 06/13/2022    History of pregnancy    Other specified noninflammatory disorders of vagina 09/14/2021    Vaginal discharge     No past surgical history on file.  Current Outpatient Medications on File Prior to Visit   Medication Sig Dispense Refill    buPROPion XL (Wellbutrin XL) 150 mg 24 hr tablet Take 1 tablet (150 mg) by mouth once daily in the morning. Take before meals.      gentamicin (Garamycin) 0.1 % ointment Apply 1 Application topically 2 times a day. Using PRN       benzoyl peroxide (Benzac AC) 10 % external wash Apply 1 g topically once daily.      fluticasone (Flonase) 50 mcg/actuation nasal spray Administer 1-2 sprays into each nostril. 1-2 times daily as needed for nasal congestion.  Shake gently. Before first use, prime pump. After use, clean tip and replace cap.      nicotine polacrilex (Nicorette) 2 mg gum Chew 1 each (2 mg) if needed for smoking cessation. 100 each 0    Xulane 150-35 mcg/24 hr Place 1 patch on the skin 1 (one) time per week. (Patient not taking: Reported on 5/16/2024) 4 patch 2    [DISCONTINUED] sulfamethoxazole-trimethoprim (Bactrim DS) 800-160 mg tablet Take 1 tablet by mouth twice a day.       No current facility-administered medications on file prior to visit.       PHYSICAL EXAMINATION:   General: Awake. Alert. Comfortable. No apparent distress.   Speech: Normal.  Comprehension: Normal.  Mood: Stable.  Affect: Appropriate.  ENT: audible nasal congestion - voice is slightly hypernasal  Pul:         Normal respiratory effort.   Neuro: Alert, well-oriented. Cranial nerves II-XII grossly normal and symmetric.      ASSESSMENT AND PLAN: Ms. Imani Marcial is a 28 y.o. female with history of mild DIANNE who had trouble tolerating CPAP several years ago who was found to now have worsening of her DIANNE associated with weight gain, and she is willing to retry CPAP. She prefers this over the option of a dental device.      #DIANNE  -start autoCPAP 4-16 cmH2O (DME: [facility]) and adjust the settings as needed. It is imperative to treat her DIANNE in an adequate fashion. She should try and use her machine every time she sleeps and for the maximum number of hours possible. Insurance compliance requirements were discussed.  CPAP setup instructions and tips were sent to her via [employer]    #chronic nasal congestion  -pt to start using Flonase 1-2 sprays per nostril 1-2 times daily as needed. Instructed her on proper usage    #nicotine dependence  -advised smoking  cessation    All of the above was discussed with the patient in detail. She voiced an understanding of the above and was agreeable to proceed further as advised.     Around  32 minutes  were spent with the patient plus time spent reviewing the chart, updating the chart as needed, and documenting.     FOLLOW UP: July 11 at 8:20 AM by video

## 2024-06-27 ENCOUNTER — LAB (OUTPATIENT)
Dept: LAB | Facility: LAB | Age: 29
End: 2024-06-27
Payer: COMMERCIAL

## 2024-06-27 ENCOUNTER — APPOINTMENT (OUTPATIENT)
Dept: PRIMARY CARE | Facility: CLINIC | Age: 29
End: 2024-06-27
Payer: COMMERCIAL

## 2024-06-27 VITALS
HEIGHT: 65 IN | SYSTOLIC BLOOD PRESSURE: 96 MMHG | HEART RATE: 70 BPM | BODY MASS INDEX: 31.99 KG/M2 | OXYGEN SATURATION: 97 % | DIASTOLIC BLOOD PRESSURE: 67 MMHG | WEIGHT: 192 LBS

## 2024-06-27 DIAGNOSIS — E66.9 OBESITY (BMI 30-39.9): ICD-10-CM

## 2024-06-27 DIAGNOSIS — E55.9 VITAMIN D DEFICIENCY: ICD-10-CM

## 2024-06-27 DIAGNOSIS — Z71.1 CONCERN ABOUT STD IN FEMALE WITHOUT DIAGNOSIS: ICD-10-CM

## 2024-06-27 DIAGNOSIS — E78.2 ELEVATED CHOLESTEROL WITH ELEVATED TRIGLYCERIDES: ICD-10-CM

## 2024-06-27 DIAGNOSIS — Z30.09 BIRTH CONTROL COUNSELING: Primary | ICD-10-CM

## 2024-06-27 DIAGNOSIS — D64.9 NORMOCHROMIC ANEMIA: ICD-10-CM

## 2024-06-27 DIAGNOSIS — N94.9 VAGINAL BURNING: ICD-10-CM

## 2024-06-27 LAB
25(OH)D3 SERPL-MCNC: 27 NG/ML (ref 30–100)
C TRACH RRNA SPEC QL NAA+PROBE: NEGATIVE
CHOLEST SERPL-MCNC: 169 MG/DL (ref 0–199)
CHOLESTEROL/HDL RATIO: 3.1
CLUE CELLS SPEC QL WET PREP: NORMAL
EST. AVERAGE GLUCOSE BLD GHB EST-MCNC: 108 MG/DL
HBA1C MFR BLD: 5.4 %
HDLC SERPL-MCNC: 55.1 MG/DL
HIV 1+2 AB+HIV1 P24 AG SERPL QL IA: NONREACTIVE
LDLC SERPL CALC-MCNC: 90 MG/DL
N GONORRHOEA DNA SPEC QL PROBE+SIG AMP: NEGATIVE
NON HDL CHOLESTEROL: 114 MG/DL (ref 0–149)
PREGNANCY TEST URINE, POC: NEGATIVE
T VAGINALIS SPEC QL WET PREP: NORMAL
TREPONEMA PALLIDUM IGG+IGM AB [PRESENCE] IN SERUM OR PLASMA BY IMMUNOASSAY: NONREACTIVE
TRICHOMONAS REFLEX COMMENT: NORMAL
TRIGL SERPL-MCNC: 120 MG/DL (ref 0–149)
VIT B12 SERPL-MCNC: 605 PG/ML (ref 211–911)
VLDL: 24 MG/DL (ref 0–40)
WBC VAG QL WET PREP: NORMAL
YEAST VAG QL WET PREP: NORMAL

## 2024-06-27 PROCEDURE — 87591 N.GONORRHOEAE DNA AMP PROB: CPT

## 2024-06-27 PROCEDURE — 81025 URINE PREGNANCY TEST: CPT

## 2024-06-27 PROCEDURE — 83036 HEMOGLOBIN GLYCOSYLATED A1C: CPT

## 2024-06-27 PROCEDURE — 86780 TREPONEMA PALLIDUM: CPT

## 2024-06-27 PROCEDURE — 82306 VITAMIN D 25 HYDROXY: CPT

## 2024-06-27 PROCEDURE — 80061 LIPID PANEL: CPT

## 2024-06-27 PROCEDURE — 82607 VITAMIN B-12: CPT

## 2024-06-27 PROCEDURE — 87210 SMEAR WET MOUNT SALINE/INK: CPT

## 2024-06-27 PROCEDURE — 36415 COLL VENOUS BLD VENIPUNCTURE: CPT

## 2024-06-27 PROCEDURE — 87389 HIV-1 AG W/HIV-1&-2 AB AG IA: CPT

## 2024-06-27 PROCEDURE — 87491 CHLMYD TRACH DNA AMP PROBE: CPT

## 2024-06-27 PROCEDURE — 99214 OFFICE O/P EST MOD 30 MIN: CPT

## 2024-06-27 RX ORDER — CLINDAMYCIN HYDROCHLORIDE 150 MG/1
300 CAPSULE ORAL 3 TIMES DAILY
COMMUNITY
Start: 2024-06-20

## 2024-06-27 RX ORDER — MEDROXYPROGESTERONE ACETATE 150 MG/ML
150 INJECTION, SUSPENSION INTRAMUSCULAR ONCE
Status: COMPLETED | OUTPATIENT
Start: 2024-06-27 | End: 2024-06-27

## 2024-06-27 RX ADMIN — MEDROXYPROGESTERONE ACETATE 150 MG: 150 INJECTION, SUSPENSION INTRAMUSCULAR at 10:14

## 2024-06-27 ASSESSMENT — PATIENT HEALTH QUESTIONNAIRE - PHQ9
6. FEELING BAD ABOUT YOURSELF - OR THAT YOU ARE A FAILURE OR HAVE LET YOURSELF OR YOUR FAMILY DOWN: MORE THAN HALF THE DAYS
9. THOUGHTS THAT YOU WOULD BE BETTER OFF DEAD, OR OF HURTING YOURSELF: NOT AT ALL
3. TROUBLE FALLING OR STAYING ASLEEP OR SLEEPING TOO MUCH: NOT AT ALL
1. LITTLE INTEREST OR PLEASURE IN DOING THINGS: MORE THAN HALF THE DAYS
2. FEELING DOWN, DEPRESSED OR HOPELESS: NEARLY EVERY DAY
7. TROUBLE CONCENTRATING ON THINGS, SUCH AS READING THE NEWSPAPER OR WATCHING TELEVISION: NEARLY EVERY DAY
8. MOVING OR SPEAKING SO SLOWLY THAT OTHER PEOPLE COULD HAVE NOTICED. OR THE OPPOSITE, BEING SO FIGETY OR RESTLESS THAT YOU HAVE BEEN MOVING AROUND A LOT MORE THAN USUAL: NOT AT ALL
4. FEELING TIRED OR HAVING LITTLE ENERGY: MORE THAN HALF THE DAYS
SUM OF ALL RESPONSES TO PHQ QUESTIONS 1-9: 12
10. IF YOU CHECKED OFF ANY PROBLEMS, HOW DIFFICULT HAVE THESE PROBLEMS MADE IT FOR YOU TO DO YOUR WORK, TAKE CARE OF THINGS AT HOME, OR GET ALONG WITH OTHER PEOPLE: SOMEWHAT DIFFICULT
SUM OF ALL RESPONSES TO PHQ9 QUESTIONS 1 AND 2: 5
5. POOR APPETITE OR OVEREATING: NOT AT ALL

## 2024-06-27 ASSESSMENT — PAIN SCALES - GENERAL: PAINLEVEL: 0-NO PAIN

## 2024-06-27 ASSESSMENT — ENCOUNTER SYMPTOMS
LOSS OF SENSATION IN FEET: 0
DEPRESSION: 0
OCCASIONAL FEELINGS OF UNSTEADINESS: 0

## 2024-06-27 NOTE — PROGRESS NOTES
"Subjective   Patient ID: Imani Marcial is a 28 y.o. female who presents for Follow-up.    HPI   DIANNE  - has follow up with sleep medicine and is now on CPAP, with intermittence compliance   - endorse improvement in sleeping symptoms     HS flare up   - s/p her peroid and hot weather, wasing using the clindamycin     STD/STI testing  - concerns about possible UTI with vaginal discharge and unusual sensation   - requesting to get tested for various types of included HIV   - Denies foul smell, weird color; intermittent weird sensation burning sensation     Birth control  - interested in starting DEPO again, since not good with pills or patchs  - LMP 6/15/2024 ended on the 6/20/24   - currently sexual active with one partner   - last sexual active prior to her period   - discussed both additional options of IUD and intradermal options, patient amendable after discussion to get IUD     Blood work  -requesting to get blood work for vitamin testing   - occasional takes B12 vitamin and beneficial wondering if has deficiency     Review of Systems  ROS negative unless noted in HPI    Objective   BP 96/67   Pulse 70   Ht 1.651 m (5' 5\")   Wt 87.1 kg (192 lb)   SpO2 97%   BMI 31.95 kg/m²     Physical Exam  Vitals reviewed. Exam conducted with a chaperone present.   Constitutional:       General: She is not in acute distress.     Appearance: Normal appearance. She is obese.   HENT:      Head: Normocephalic and atraumatic.   Eyes:      Conjunctiva/sclera: Conjunctivae normal.   Cardiovascular:      Rate and Rhythm: Normal rate and regular rhythm.      Pulses: Normal pulses.      Heart sounds: Normal heart sounds. No murmur heard.     No friction rub. No gallop.   Pulmonary:      Effort: Pulmonary effort is normal. No respiratory distress.      Breath sounds: Normal breath sounds.   Abdominal:      General: Abdomen is flat. Bowel sounds are normal. There is no distension.      Palpations: Abdomen is soft.      Tenderness: " There is no abdominal tenderness. There is no guarding.   Genitourinary:     Exam position: Supine.      Pubic Area: No rash or pubic lice.       Labia:         Right: No rash, tenderness, lesion or injury.         Left: No rash, tenderness, lesion or injury.       Vagina: Normal.      Cervix: Normal. No discharge, friability, lesion or erythema.   Musculoskeletal:      Cervical back: Normal range of motion and neck supple.   Neurological:      General: No focal deficit present.      Mental Status: She is alert and oriented to person, place, and time.   Psychiatric:         Mood and Affect: Mood normal.         Behavior: Behavior normal.         Thought Content: Thought content normal.         Judgment: Judgment normal.       Assessment/Plan   Imani Johnson is 29 y/o F w/ DIANNE (on CPAP), Hidradenitis suppurativa, anxiety/depression who presented with concerns of UTI/ infection, discussion on birth control planning, and requesting blood work for vitamin deficiency and history of normochromic anemia.  Plan below:    Diagnoses and all orders for this visit:  Birth control counseling  - POCT Pregnancy: negative   - Plan on dose of depo and then schedule procedure visit for IUD placement; Mirena ordered for follow up visit  - advised to review [email or web address]  -     POCT Pregnancy, Urine manually resulted  -     medroxyPROGESTERone (Depo-Provera) injection 150 mg  -     levonorgestrel (Mirena) 21 mcg/24 hr (8 yrs) 52 mg IUD  Vitamin D deficiency  -     Vitamin D 25-Hydroxy,Total (for eval of Vitamin D levels); Future  Normochromic anemia  -     Vitamin B12; Future  Vaginal burning  - UTI vs Vaginitis vs yeast infection vs STD/STI, less likely cervicitis due to PE findings    -     Wet Prep with Trichomonas Reflex If Neg; Future  -     C. trachomatis / N. gonorrhoeae, DNA probe; Future  -     HIV 1/2 Antigen/Antibody Screen with Reflex to Confirmation; Future  -     Syphilis Screen with Reflex; Future  Elevated  cholesterol with elevated triglycerides  -     Lipid panel; Future  Obesity (BMI 30-39.9)  -     Lipid panel; Future  -     Hemoglobin A1c; Future  Concern about STD in female without diagnosis  -     HIV 1/2 Antigen/Antibody Screen with Reflex to Confirmation; Future  -     Syphilis Screen with Reflex; Future  Other orders  -     Follow Up In Primary Care - Health Maintenance  -     Follow Up In Primary Care - Established; Future       **RTC in 2-3 months for IUD placement or sooner if needed    Patient was examined and discussed with Dr. Aryan Germain MD MS  PGY-2 Family Medicine

## 2024-06-27 NOTE — PROGRESS NOTES
Attestation: I discussed the patient with the attendee, and reviewed the attendee's documentation. I agree with the attendee's medical decision making and plans as documented in the attendee's note.      Abraham Salazar MD  FM & PM Resident

## 2024-07-03 NOTE — PROGRESS NOTES
I saw and evaluated the patient. I personally obtained the key and critical portions of the history and physical exam or was physically present for key and critical portions performed by the resident/fellow. I reviewed the resident/fellow's documentation and discussed the patient with the resident/fellow. I agree with the resident/fellow's medical decision making as documented in the note.    Kym Baeza MD

## 2024-07-11 ENCOUNTER — APPOINTMENT (OUTPATIENT)
Dept: SLEEP MEDICINE | Facility: CLINIC | Age: 29
End: 2024-07-11
Payer: COMMERCIAL

## 2024-07-19 ENCOUNTER — APPOINTMENT (OUTPATIENT)
Dept: SLEEP MEDICINE | Facility: CLINIC | Age: 29
End: 2024-07-19
Payer: COMMERCIAL

## 2024-07-19 DIAGNOSIS — G47.33 OBSTRUCTIVE SLEEP APNEA OF ADULT: Primary | ICD-10-CM

## 2024-07-19 PROCEDURE — 99213 OFFICE O/P EST LOW 20 MIN: CPT | Performed by: PSYCHIATRY & NEUROLOGY

## 2024-07-19 RX ORDER — BUPROPION HYDROCHLORIDE 300 MG/1
300 TABLET ORAL
COMMUNITY
Start: 2024-06-27

## 2024-07-19 NOTE — PROGRESS NOTES
Patient: Imani Marcial    [id number]  : 1995 -- AGE 28 y.o.    Provider: Rod Hall MD     [facility]   Service Date: 2024              [facility]  Follow-up Note      Virtual or Telephone Consent  An interactive audio and video telecommunication system which permits real time communications between the patient (at the originating site) and provider (at the distant site) was utilized to provide this telehealth service.   Verbal consent was requested and obtained from Imani Marcial on this date, 24 for a telehealth visit.   I verified the patient's identity and physical location in [place].  If this is a new patient to me, I informed the patient of my name and type of active [place] license that I hold.          HPI: Imani Marcial is a 28 y.o. female with DIANNE with pathologic sleepiness on MSLT () with urine drug screen at the time being presumptive positive for cannabinoids, who tried CPAP but did not like it due to the full face mask and taking it off in the night due to frequent nocturia, and she has persistent sleep-related symptoms that worsened with weight gain. She underwent repeat sleep testing in 2024 that showed DIANNE (moderate by AASM criteria and mild by CMS criteria) and was restarted on CPAP. Of note, she has tonsillar enlargement, nasal congestion, nasal septum deviation, and marijuana use several times per week, and PMH is otherwise notable for insomnia, hidradenitis, nicotine dependence, vitamin D deficiency, and anxiety/depression. She is here today for a follow up visit. She was last seen by me on 24, at which time autoCPAP was prescribed and she was advised to use Flonase daily.      Has been trying to use CPAP, but not in the last few weeks due to her mask not staying attached to the piece that connects to the hose. She is not able to connect it again to use it. DME sent her a video for her to watch. She may have to  go see her DME for help.    Mask is comfortable enough, but she is not used to it yet.     Felt better when was able to sleep the night with it, did not need to nap those days.    Has been using Flonase more often, not daily, but it is helping.    Recently had her Wellbutrin increased from 150 mg to 300 mg daily. Not taking medication daily due to forgetting some days.    Working with PCP for smoking cessation.      PAP DEVICE     DME - [facility]   Setup date: 24  Type: Melissa G3  Settin-16 cm H2O  Finding benefit: yes  MASK  Type: F30 med  Fit: sometimes noticed mask leak    Prior Sleep studies:   -PSG 12/15/2019: Weight 70.0 kg (BMI 25.1). Pt's complainst were hypersomnia, snoring and witnessed apneas. Study showed mild DIANNE with AHI 7.2 due to 42 hypopneas, 2 obstructive apneas 1 central apnea.  REM AHI 1.6, supine AHI 0.  SpO2 chavez 51%.  0 PLMS. Sleep onset latency 14.4 minutes, REM latency 71.5 minutes.  minutes. Frequent sleep disruption, see hypnogram below. Study was followed by an MSLT.    -MSLT 2019: Mean sleep latency 6.4 minutes (6.8, 7.8, 4.6, 3.0, and 9.9 minutes in naps 1-5, respectively) with no SOREMs. Urine drug screen was presumptive positive for cannabinoids  -PSG 2024: Weight 192 pounds.  BMI 32.0.  Moderate DIANNE by AASM criteria with RDI3% 22.5/h, mild DIANNE by CMS criteria with RDI4% 9.7/h.  Mean SpO2 during sleep 97%, chavez 85% with 0.9 minutes spent at or below 88%. Occasional brief bursts of loss of normal REM atonia in chin EMG channel, but no abnormal behavior.  Persistent airflow limitation throughout the study.  No PLMS.                      REVIEW OF MACHINE DOWNLOAD:   Date Range: 24-24 - no transmitted data beyond this date  % Days used: =44%  % Days with Usage >= 4 hrs: =13%  Average usage days used: 3 h 34 min   Settin-16 cmH2O  95th percentile pressure: 7.6 cmH2O, avg pressure 6.2 cmH2O  Avg leak 24 L/min, avg high leak minutes:  21  Residual AHI: 1.5      Patient Active Problem List   Diagnosis    Depression with anxiety    Hidradenitis suppurativa    Obstructive sleep apnea of adult    Vitamin D deficiency    Cigarette nicotine dependence without complication     Past Medical History:   Diagnosis Date    Encounter for immunization 11/02/2022    Need for vaccination    Encounter for screening for malignant neoplasm of cervix 10/29/2018    Cervical cancer screening    Other conditions influencing health status 06/13/2022    History of pregnancy    Other specified noninflammatory disorders of vagina 09/14/2021    Vaginal discharge     No past surgical history on file.  Current Outpatient Medications on File Prior to Visit   Medication Sig Dispense Refill    benzoyl peroxide (Benzac AC) 10 % external wash Apply 1 g topically once daily.      buPROPion XL (Wellbutrin XL) 300 mg 24 hr tablet Take 1 tablet (300 mg) by mouth once daily in the morning. Take before meals.      fluticasone (Flonase) 50 mcg/actuation nasal spray Administer 1-2 sprays into each nostril. 1-2 times daily as needed for nasal congestion.  Shake gently. Before first use, prime pump. After use, clean tip and replace cap.      gentamicin (Garamycin) 0.1 % ointment Apply 1 Application topically 2 times a day. Using PRN      nicotine polacrilex (Nicorette) 2 mg gum Chew 1 each (2 mg) if needed for smoking cessation. 100 each 0    [DISCONTINUED] buPROPion XL (Wellbutrin XL) 150 mg 24 hr tablet Take 1 tablet (150 mg) by mouth once daily in the morning. Take before meals.      [DISCONTINUED] clindamycin (Cleocin) 150 mg capsule Take 2 capsules (300 mg) by mouth 3 times a day.       Current Facility-Administered Medications on File Prior to Visit   Medication Dose Route Frequency Provider Last Rate Last Admin    [START ON 10/1/2024] levonorgestrel (Mirena) 21 mcg/24 hr (8 yrs) 52 mg IUD   intrauterine Once Rony Germain MD           PHYSICAL EXAMINATION:   General: Awake.  Alert. Comfortable. No apparent distress.   Speech: Normal.  Comprehension: Normal.  Mood: Stable.  Affect: Appropriate.  Pul:         Normal respiratory effort.   Neuro: Alert, well-oriented. Cranial nerves II-XII grossly normal and symmetric. No abnormal movements noted.       ASSESSMENT AND PLAN: Ms. Imani Marcial is a 28 y.o. female with DIANNE on CPAP who gets benefit from therapy and does not need to nap in the daytime when she has slept with CPAP, but she has issues with her mask and needs help from the DME. Her compliance is not at goal yet, but her sleep apnea is very well-controlled with CPAP. Mask leak high at times.      #DIANNE  -pt to work with DME regarding mask problem  -reminded pt of the insurance CPAP compliance requirement    All of the above was discussed with the patient in detail. She voiced an understanding of the above and was agreeable to proceed further as advised.     20 minutes were spent with the patient plus time spent reviewing the chart, updating the chart as needed, and documenting.     FOLLOW UP: Aug 12 at 9:20 AM via video for CPAP follow up

## 2024-08-12 ENCOUNTER — APPOINTMENT (OUTPATIENT)
Dept: SLEEP MEDICINE | Facility: CLINIC | Age: 29
End: 2024-08-12
Payer: COMMERCIAL

## 2024-08-12 DIAGNOSIS — G47.33 OBSTRUCTIVE SLEEP APNEA OF ADULT: Primary | ICD-10-CM

## 2024-08-12 PROCEDURE — 99213 OFFICE O/P EST LOW 20 MIN: CPT | Performed by: PSYCHIATRY & NEUROLOGY

## 2024-08-12 NOTE — PROGRESS NOTES
" Patient: Imani Marcial    [id number]  : 1995 -- AGE 29 y.o.    Provider: Rod Hall MD     [facility]   Service Date: 2024              [facility]  Follow-up Note      Virtual or Telephone Consent  An interactive audio and video telecommunication system which permits real time communications between the patient (at the originating site) and provider (at the distant site) was utilized to provide this telehealth service.   Verbal consent was requested and obtained from Imani Marcial on this date, 24 for a telehealth visit.   I verified the patient's identity and physical location in [place].  If this is a new patient to me, I informed the patient of my name and type of active [place] license that I hold.        HPI: Imani Marcial is a 29 y.o. female with DIANNE with pathologic sleepiness on MSLT () with urine drug screen at the time being presumptive positive for cannabinoids, who tried CPAP but did not like it due to the full face mask and taking it off in the night due to frequent nocturia, and she has persistent sleep-related symptoms that worsened with weight gain. She underwent repeat sleep testing in 2024 that showed DAINNE (moderate by AASM criteria and mild by CMS criteria) and was restarted on CPAP. Of note, she has tonsillar enlargement, nasal congestion, nasal septum deviation, and marijuana use several times per week, and PMH is otherwise notable for insomnia, hidradenitis, nicotine dependence, vitamin D deficiency, and anxiety/depression. She was prescribed autoCPAP again on 24 and was advised to use her Flonase daily. She is here today for a follow up visit. At last visit on 24 she was deriving benefit from CPAP, but was having trouble using it due to issues with her mask not properly fitting to her hose.    CPAP \"is not really working for me\". Intends to use it, but falls asleep before putting it on. May lay down with her " daughter and fall asleep before putting on her mask. Able to now connect mask to the hose without issue. She is on day  of her compliance period.    Mask and pressures are tolerable. Felt better the mornings after she got a good night of CPAP usage. Agreeable to trying to be more diligent about using CPAP.    PAP DEVICE   DME - [facility]   Setup date: 24  Type: Melissa G3  Settin-16 cm H2O  Finding benefit: yes  MASK  Type: F30 med  Fit: good    Prior Sleep studies:   -PSG 12/15/2019: Weight 70.0 kg (BMI 25.1). Pt's complaints were hypersomnia, snoring and witnessed apneas. Study showed mild DIANNE with AHI 7.2 due to 42 hypopneas, 2 obstructive apneas 1 central apnea.  REM AHI 1.6, supine AHI 0.  SpO2 chavez 51%.  0 PLMS. Sleep onset latency 14.4 minutes, REM latency 71.5 minutes.  minutes. Frequent sleep disruption, see hypnogram below. Study was followed by an MSLT.    -MSLT 2019: Mean sleep latency 6.4 minutes (6.8, 7.8, 4.6, 3.0, and 9.9 minutes in naps 1-5, respectively) with no SOREMs. Urine drug screen was presumptive positive for cannabinoids  -PSG 2024: Weight 192 pounds.  BMI 32.0.  Moderate DIANNE by AASM criteria with RDI3% 22.5/h, mild DIANNE by CMS criteria with RDI4% 9.7/h.  Mean SpO2 during sleep 97%, chavez 85% with 0.9 minutes spent at or below 88%. Occasional brief bursts of loss of normal REM atonia in chin EMG channel, but no abnormal behavior.  Persistent airflow limitation throughout the study.  No PLMS.                              REVIEW OF MACHINE DOWNLOAD:   Date Range: 24-24 - no recorded usage beyond this  % Days used: 23%  % Days with Usage >= 4 hrs: 10%  Average usage days used: 4 h 21 min   Settin-16 cmH2O  95th percentile pressure: 8.5 cmH2O, average pressure 6.6 cmH2O  Avg high leak: 0 min, avg leak 3.3 L/min  Residual AHI: 1.7    Patient Active Problem List   Diagnosis    Depression with anxiety    Hidradenitis suppurativa    Obstructive sleep  apnea of adult    Vitamin D deficiency    Cigarette nicotine dependence without complication     Past Medical History:   Diagnosis Date    Encounter for immunization 11/02/2022    Need for vaccination    Encounter for screening for malignant neoplasm of cervix 10/29/2018    Cervical cancer screening    Other conditions influencing health status 06/13/2022    History of pregnancy    Other specified noninflammatory disorders of vagina 09/14/2021    Vaginal discharge     No past surgical history on file.  Current Outpatient Medications on File Prior to Visit   Medication Sig Dispense Refill    benzoyl peroxide (Benzac AC) 10 % external wash Apply 1 g topically once daily.      buPROPion XL (Wellbutrin XL) 300 mg 24 hr tablet Take 1 tablet (300 mg) by mouth once daily in the morning. Take before meals.      fluticasone (Flonase) 50 mcg/actuation nasal spray Administer 1-2 sprays into each nostril. 1-2 times daily as needed for nasal congestion.  Shake gently. Before first use, prime pump. After use, clean tip and replace cap.      gentamicin (Garamycin) 0.1 % ointment Apply 1 Application topically 2 times a day. Using PRN      nicotine polacrilex (Nicorette) 2 mg gum Chew 1 each (2 mg) if needed for smoking cessation. 100 each 0     Current Facility-Administered Medications on File Prior to Visit   Medication Dose Route Frequency Provider Last Rate Last Admin    [START ON 10/1/2024] levonorgestrel (Mirena) 21 mcg/24 hr (8 yrs) 52 mg IUD   intrauterine Once Rony Germain MD           PHYSICAL EXAMINATION:   General: Awake. Alert. Comfortable. No apparent distress.   Speech: Normal.  Comprehension: Normal.  Mood: Stable.  Affect: Appropriate.  Pul:         Normal respiratory effort.   Neuro: Alert, well-oriented. Cranial nerves II-XII grossly normal and symmetric.      ASSESSMENT AND PLAN: Ms. Imani Marcial is a 29 y.o. female with moderate DIANNE who was started on CPAP 2 months ago. She tolerate CPAP well and gets  benefit from it, but her usage is very limited due to falling asleep before putting it on and forgetting to put it on. Her sleep apnea is treated very well with CPAP and her mask fit is excellent.    #DIANNE  -reviewed alternative treatment options. She will do her best to be compliant with CPAP. I reviewed with her the minimum compliance requirement of at least 4 hours per night on 21 days of the next month  -advised pt to put mask on her pillow to remind her to wear CPAP     All of the above was discussed with the patient in detail. She voiced an understanding of the above and was agreeable to proceed further as advised.     24 minutes were spent with the patient plus time spent reviewing the chart, updating the chart as needed, and documenting.     FOLLOW UP: Sept 9 at 9:40 AM via video for CPAP compliance follow up

## 2024-09-09 ENCOUNTER — APPOINTMENT (OUTPATIENT)
Dept: SLEEP MEDICINE | Facility: CLINIC | Age: 29
End: 2024-09-09
Payer: COMMERCIAL

## 2024-09-10 ENCOUNTER — APPOINTMENT (OUTPATIENT)
Dept: PRIMARY CARE | Facility: CLINIC | Age: 29
End: 2024-09-10
Payer: COMMERCIAL

## 2024-10-01 ENCOUNTER — APPOINTMENT (OUTPATIENT)
Dept: PRIMARY CARE | Facility: CLINIC | Age: 29
End: 2024-10-01
Payer: COMMERCIAL

## 2024-10-03 ENCOUNTER — APPOINTMENT (OUTPATIENT)
Dept: OBSTETRICS AND GYNECOLOGY | Facility: CLINIC | Age: 29
End: 2024-10-03
Payer: COMMERCIAL

## 2024-10-08 ENCOUNTER — OFFICE VISIT (OUTPATIENT)
Dept: OBSTETRICS AND GYNECOLOGY | Facility: CLINIC | Age: 29
End: 2024-10-08
Payer: COMMERCIAL

## 2024-10-08 VITALS
WEIGHT: 200 LBS | DIASTOLIC BLOOD PRESSURE: 60 MMHG | BODY MASS INDEX: 33.32 KG/M2 | SYSTOLIC BLOOD PRESSURE: 98 MMHG | HEIGHT: 65 IN

## 2024-10-08 DIAGNOSIS — Z11.3 ROUTINE SCREENING FOR STI (SEXUALLY TRANSMITTED INFECTION): ICD-10-CM

## 2024-10-08 DIAGNOSIS — Z30.017 INSERTION OF NEXPLANON: Primary | ICD-10-CM

## 2024-10-08 DIAGNOSIS — Z01.419 ENCOUNTER FOR ANNUAL ROUTINE GYNECOLOGICAL EXAMINATION: ICD-10-CM

## 2024-10-08 PROCEDURE — 87491 CHLMYD TRACH DNA AMP PROBE: CPT

## 2024-10-08 PROCEDURE — 99395 PREV VISIT EST AGE 18-39: CPT | Performed by: OBSTETRICS & GYNECOLOGY

## 2024-10-08 PROCEDURE — 4004F PT TOBACCO SCREEN RCVD TLK: CPT | Performed by: OBSTETRICS & GYNECOLOGY

## 2024-10-08 PROCEDURE — 87591 N.GONORRHOEAE DNA AMP PROB: CPT

## 2024-10-08 PROCEDURE — 3008F BODY MASS INDEX DOCD: CPT | Performed by: OBSTETRICS & GYNECOLOGY

## 2024-10-08 PROCEDURE — 87661 TRICHOMONAS VAGINALIS AMPLIF: CPT

## 2024-10-08 NOTE — PROGRESS NOTES
"Assessment     PLAN  1. Encounter for annual routine gynecological examination  - pap UTD    2. Routine screening for STI (sexually transmitted infection)  - C. trachomatis / N. gonorrhoeae, Amplified  - Trichomonas vaginalis, Amplified    3. Insertion of Nexplanon  Reviewed all options and nexplanon inserted today   - Insertion of Contraceptive Capsule    Please return for your next visit in 1 year or sooner as needed.    Alivia Mccartney MD      Subjective     Imani Marcial is a 29 y.o. female who is here for a routine exam.   PCP = Rony Germain MD    APE Concerns: None    Gynecologic History:    OBHx:  x 1  Menses: abnormal since restarting depo, overdue for injection  Last Pap: NILM 2022  HPV Vaccine: unsure, will try to find out  History of Dysplasia: Denies  Sexually active: Active  STI testing: Desires GCT  Contraception: Desires nexplanon today   Mammogram: NA  FamHx of GYN cancers: Denies      PMH, PSH, FH, SH, medications and allergies reviewed and edited as needed.      Objective   BP 98/60   Ht 1.651 m (5' 5\")   Wt 90.7 kg (200 lb)   LMP 2024 (Exact Date)   BMI 33.28 kg/m²      General:   Alert and oriented, in no acute distress   Neck: Supple. No visible thyromegaly.    Breast/Axilla: Normal to palpation bilaterally without masses, skin changes, or nipple discharge.    Abdomen: Soft, non-tender, without masses or organomegaly   Vulva: Scarring from hidradenitis   Vagina: Normal mucosa without lesions, masses, or atrophy. No abnormal vaginal discharge.    Cervix: Normal without masses, lesions, or signs of cervicitis.    Uterus: Normal mobile, non-enlarged uterus    Adnexa: Normal without masses or lesions   Pelvic Floor No POP noted. No high tone pelvic floor   Psych Normal affect. Normal mood.      Patient ID: Imani Marcial is a 29 y.o. female.    Insertion of Contraceptive Capsule    Date/Time: 10/8/2024 1:30 PM    Performed by: Alivia Mccartney MD  Authorized by: " Alivia Mccartney MD    Consent:     Consent obtained:  Written    Consent given by:  Patient    Procedural risks discussed:  Bleeding and infection    Patient questions answered: yes      Patient agrees, verbalizes understanding, and wants to proceed: yes    Indication:     Indication: Insertion of non-biodegradable drug delivery implant    Pre-procedure:     Pre-procedure timeout performed: yes      Prepped with: povidone-iodine      Local anesthetic:  Lidocaine 1%    The site was cleaned and prepped in a sterile fashion: yes    Procedure:     Procedure:  Insertion    Left/right:  Right    Preloaded contraceptive capsule trocar was placed subdermally: yes      Visualization of implant was obtained: yes      Contraceptive capsule was inserted and trocar removed: yes      Visualization of notch in stylet and palpation of device: yes      Palpation confirms placement by provider and patient: yes      Site was closed with steri-strips and pressure bandage applied: yes

## 2024-10-09 LAB
C TRACH RRNA SPEC QL NAA+PROBE: NEGATIVE
N GONORRHOEA DNA SPEC QL PROBE+SIG AMP: NEGATIVE
T VAGINALIS RRNA SPEC QL NAA+PROBE: NEGATIVE

## 2025-01-28 ENCOUNTER — APPOINTMENT (OUTPATIENT)
Dept: PRIMARY CARE | Facility: CLINIC | Age: 30
End: 2025-01-28
Payer: COMMERCIAL

## 2025-02-03 ENCOUNTER — OFFICE VISIT (OUTPATIENT)
Dept: PODIATRY | Facility: CLINIC | Age: 30
End: 2025-02-03
Payer: COMMERCIAL

## 2025-02-03 VITALS — BODY MASS INDEX: 33.32 KG/M2 | WEIGHT: 200 LBS | HEIGHT: 65 IN

## 2025-02-03 DIAGNOSIS — M20.41 HAMMERTOE, BILATERAL: ICD-10-CM

## 2025-02-03 DIAGNOSIS — F17.210 CIGARETTE NICOTINE DEPENDENCE WITHOUT COMPLICATION: ICD-10-CM

## 2025-02-03 DIAGNOSIS — L60.3 NAIL DYSTROPHY: Primary | ICD-10-CM

## 2025-02-03 DIAGNOSIS — M20.42 HAMMERTOE, BILATERAL: ICD-10-CM

## 2025-02-03 DIAGNOSIS — M20.11 HALLUX VALGUS, BILATERAL: ICD-10-CM

## 2025-02-03 DIAGNOSIS — M20.12 HALLUX VALGUS, BILATERAL: ICD-10-CM

## 2025-02-03 PROCEDURE — 99243 OFF/OP CNSLTJ NEW/EST LOW 30: CPT | Performed by: PODIATRIST

## 2025-02-03 PROCEDURE — 3008F BODY MASS INDEX DOCD: CPT | Performed by: PODIATRIST

## 2025-02-03 PROCEDURE — 11755 BIOPSY NAIL UNIT: CPT | Performed by: PODIATRIST

## 2025-02-03 PROCEDURE — 87206 SMEAR FLUORESCENT/ACID STAI: CPT | Mod: OUT | Performed by: PODIATRIST

## 2025-02-03 PROCEDURE — 99213 OFFICE O/P EST LOW 20 MIN: CPT | Mod: 25 | Performed by: PODIATRIST

## 2025-02-03 NOTE — PROGRESS NOTES
Chief Complaint   Patient presents with    Nail Problem     RT & LT 4TH, 5TH TOES     Patient has noticed last few years discoloration of the lateral digits.  She is also concerned that it seems to be spreading more inward.  There is also some lines on the great toenails.  Also some discoloration of the third digit distal IP joint bilaterally.  Longstanding progressive bunion deformities.  There is also overlapping of the second digits on the third digits.  At times there is discomfort here at times itching.  Skin is darker over this area.  Past medical history noncontributory.  History of hidradenitis.  [facility]  [facility].  She was using nicotine gum.    Review of systems negative except as noted above.      General/Constitutional: Alert. NAD.   Respiratory: Non labored breathing.   Psychiatric: Mood and affect normal/baseline.   HEENT: Sclera clear.   Dermatologic: Fourth and fifth digit nails bilaterally show mild dystrophic changes.  Hyperpigmentation uniform on the digits noted.  No acute inflammatory infectious process. Web spaces are dry. No ulcers no pre-ulcerative areas.  There is mild induration at the distal IP joint primarily on the third digit left foot from the overriding second digit.  Hyperpigmentation noted in this area.  Vascular: Pedal pulses are intact and symmetric including the dorsalis pedis and the posterior tibial pulses. Feet are warm to touch. No swelling appreciated either extremity.  Neurological: Alert and oriented. No acute distress. No sensory impairment bilateral.  Musculoskeletal: Strength is normal for age. No acute deficits appreciated.  Hallux valgus deformity.  Hammertoe deformity second digits.  Slight overlapping of the third digits.      Impression: Hallux valgus and hammertoe deformities as per above.  Dystrophic nails.      -Today's treatment and course of therapy was discussed with the patient in detail. Patient's questions were answered. Proper foot care was  discussed. This dictation was done using Dragon computer software and   as such may contain grammatical errors.  Patient understanding of current course of therapy and findings.    -Medical notes 10/21/2024 reviewed.    -Nail culture taken fourth and fifth digit nails bilaterally.    -Will see you back in 5 to 6 weeks to further discuss this.    -Counseled patient that make sure that your shoes are wide enough with with the bunion deformity and the displacement of the second digits this is causing pressure on the third toes.  Also would likely pressure at the distal fourth and fifth digits bilaterally.    -Smoking cessation discussed.    -Most recent labs from 624 reviewed.

## 2025-02-19 ENCOUNTER — LAB REQUISITION (OUTPATIENT)
Dept: LAB | Facility: HOSPITAL | Age: 30
End: 2025-02-19
Payer: COMMERCIAL

## 2025-02-19 DIAGNOSIS — B35.1 TINEA UNGUIUM: ICD-10-CM

## 2025-03-11 ENCOUNTER — APPOINTMENT (OUTPATIENT)
Facility: HOSPITAL | Age: 30
End: 2025-03-11
Payer: COMMERCIAL

## 2025-03-17 ENCOUNTER — OFFICE VISIT (OUTPATIENT)
Dept: PODIATRY | Facility: CLINIC | Age: 30
End: 2025-03-17
Payer: COMMERCIAL

## 2025-03-17 DIAGNOSIS — L60.3 NAIL DYSTROPHY: Primary | ICD-10-CM

## 2025-03-17 DIAGNOSIS — F17.210 CIGARETTE NICOTINE DEPENDENCE WITHOUT COMPLICATION: ICD-10-CM

## 2025-03-17 DIAGNOSIS — M20.42 HAMMERTOE, BILATERAL: ICD-10-CM

## 2025-03-17 DIAGNOSIS — M20.11 HALLUX VALGUS, BILATERAL: ICD-10-CM

## 2025-03-17 DIAGNOSIS — M20.12 HALLUX VALGUS, BILATERAL: ICD-10-CM

## 2025-03-17 DIAGNOSIS — M20.41 HAMMERTOE, BILATERAL: ICD-10-CM

## 2025-03-17 PROCEDURE — 99406 BEHAV CHNG SMOKING 3-10 MIN: CPT | Performed by: PODIATRIST

## 2025-03-17 PROCEDURE — 99214 OFFICE O/P EST MOD 30 MIN: CPT | Performed by: PODIATRIST

## 2025-03-17 RX ORDER — BENZOYL PEROXIDE 50 MG/ML
LIQUID TOPICAL
COMMUNITY
Start: 2025-03-06

## 2025-03-17 NOTE — PROGRESS NOTES
Chief Complaint   Patient presents with    Follow-up     F/U NAIL DYSTROPHY     Chief Complaint   Patient presents with    Follow-up     F/U NAIL DYSTROPHY   Follow-up for nail culture results.  No other complaints with the feet.  No changes past medical history.  Bunions are stable..  There is also overlapping of the second digits on the third digits.  At times there is discomfort here at times itching.  Skin is darker over this area.  Past medical history noncontributory.  History of hidradenitis.  [facility]  Vapes.  She was using nicotine gum.    Review of systems negative except as noted above.      General/Constitutional: Alert. NAD.   Respiratory: Non labored breathing.   Psychiatric: Mood and affect normal/baseline.   HEENT: Sclera clear.   Dermatologic: Baseline findings with mild dystrophic nails especially 4 and 5 bilateral.  Previous hyperpigmentation at the IP joints lesser digit has resolved.  No acute inflammatory infectious process. Web spaces are dry. No ulcers no pre-ulcerative areas.    Vascular: Pedal pulses are intact and symmetric including the dorsalis pedis and the posterior tibial pulses. Feet are warm to touch. No swelling appreciated either extremity.  Neurological: Alert and oriented. No acute distress. No sensory impairment bilateral.  Musculoskeletal: Strength is normal for age. No acute deficits appreciated.  Hallux valgus deformity.  Hammertoe deformity second digits.  Slight overlapping of the third digits.  Culture results negative for fungus and septated hyphae.  Calcofluor negative.    Impression: Stable hallux valgus and hammertoe deformities as per above.  Dystrophic nails.      -Today's treatment and course of therapy was discussed with the patient in detail. Patient's questions were answered. Proper foot care was discussed. This dictation was done using Dragon computer software and as such may contain grammatical errors.  Patient understanding of current course of  therapy and findings.  Likely many of these findings are shoe related.    -Reviewed culture results with patient.  Both the stain and the 2-week growth is negative for fungus.    -I did discuss we can consider repeating a culture in several months.  If she would like to do this at the end of the summer please follow-up accordingly.    -Counseled patient on proper shoe gear.    -Smoking cessation discussed.    -Most recent labs from 6/24 reviewed.

## 2025-03-18 ENCOUNTER — APPOINTMENT (OUTPATIENT)
Dept: PODIATRY | Facility: CLINIC | Age: 30
End: 2025-03-18
Payer: COMMERCIAL

## 2025-03-24 LAB
CALCOFLUOR WHITE SPEC: NORMAL
FUNGUS SPEC CULT: NORMAL

## 2025-04-22 ENCOUNTER — APPOINTMENT (OUTPATIENT)
Facility: HOSPITAL | Age: 30
End: 2025-04-22
Payer: COMMERCIAL

## 2025-05-30 ENCOUNTER — APPOINTMENT (OUTPATIENT)
Facility: HOSPITAL | Age: 30
End: 2025-05-30
Payer: COMMERCIAL

## 2025-05-30 VITALS
SYSTOLIC BLOOD PRESSURE: 106 MMHG | HEART RATE: 89 BPM | TEMPERATURE: 97 F | OXYGEN SATURATION: 97 % | DIASTOLIC BLOOD PRESSURE: 74 MMHG | WEIGHT: 223 LBS | BODY MASS INDEX: 37.15 KG/M2 | HEIGHT: 65 IN

## 2025-05-30 DIAGNOSIS — F17.200 VAPING NICOTINE DEPENDENCE, NON-TOBACCO PRODUCT: Primary | ICD-10-CM

## 2025-05-30 DIAGNOSIS — E66.9 OBESITY (BMI 30-39.9): ICD-10-CM

## 2025-05-30 DIAGNOSIS — Z00.00 HEALTH MAINTENANCE EXAMINATION: ICD-10-CM

## 2025-05-30 PROCEDURE — 3008F BODY MASS INDEX DOCD: CPT

## 2025-05-30 PROCEDURE — 99395 PREV VISIT EST AGE 18-39: CPT

## 2025-05-30 PROCEDURE — 99395 PREV VISIT EST AGE 18-39: CPT | Mod: GC

## 2025-05-30 RX ORDER — NICOTINE 7MG/24HR
1 PATCH, TRANSDERMAL 24 HOURS TRANSDERMAL EVERY 24 HOURS
Qty: 14 PATCH | Refills: 3 | Status: SHIPPED | OUTPATIENT
Start: 2025-05-30 | End: 2025-06-13

## 2025-05-30 RX ORDER — FLUCONAZOLE 150 MG/1
TABLET ORAL
COMMUNITY
Start: 2025-02-04

## 2025-05-30 RX ORDER — DULAGLUTIDE 0.75 MG/.5ML
0.75 INJECTION, SOLUTION SUBCUTANEOUS WEEKLY
Qty: 2 ML | Refills: 11 | Status: SHIPPED | OUTPATIENT
Start: 2025-05-30

## 2025-05-30 RX ORDER — DIPHENHYDRAMINE HCL 25 MG
4 CAPSULE ORAL AS NEEDED
Qty: 100 EACH | Refills: 0 | Status: SHIPPED | OUTPATIENT
Start: 2025-05-30 | End: 2025-06-29

## 2025-05-30 RX ORDER — CLINDAMYCIN PHOSPHATE 10 UG/ML
LOTION TOPICAL
COMMUNITY
Start: 2025-05-02

## 2025-05-30 RX ORDER — GENTAMICIN SULFATE 1 MG/G
OINTMENT TOPICAL
COMMUNITY

## 2025-05-30 RX ORDER — BUPROPION HYDROCHLORIDE 150 MG/1
1 TABLET ORAL
COMMUNITY

## 2025-05-30 RX ORDER — DOXYCYCLINE 100 MG/1
1 CAPSULE ORAL
COMMUNITY
Start: 2024-10-21

## 2025-05-30 ASSESSMENT — PAIN SCALES - GENERAL: PAINLEVEL_OUTOF10: 0-NO PAIN

## 2025-05-30 NOTE — PROGRESS NOTES
Patient ID: Imani Marcial is a 29 y.o. female who presents for Health Care Maintenance Visit    Concerns: none    # vaping  -  has tried nicotine patches when she first started smoking cigarettes. At the most she was smoking 1/2 pack a day.    - vapes now and states that she uses it constantly.   - does get SOB when going from garage to clinic.     # obese   # DIANNE ( does not use CPAP)   - 3x/ week before, stair master, walk treadmill, 15 minutes of core workout.   - pt has not been adherent to workout routine recently but plans to restart.     OBGynHx:  Gynecologic History: has not had periods since nexplanon last year.   Age at menarche: 12   Menstrual cycle: no period   Contraception: nexplanon   Obstetric history: [] baby was premature. Vaginal delivery.   Pap smear: [Date, result, up to date?]  Mammogram (40-74): [Date, result, up to date?]  Bone density scan (65+): [If age-appropriate]    SoHx:  Tobacco use: not smoking.   Alcohol use: socially   Recreational drug use: denies   Occupation: LPN, [employer].   Living situation: living with partner and daugher   Exercise: 3x/ week before, stair master, walk treadmill, 15 minutes of core workout.   Diet: always hungry. States that she cooks more at home, tacos, burgers, home-made chicken, fish.   Sexual history: denies     Preventative:  -Last Colonoscopy (45-75): n/a  -Last LDCT (50-80) with 20+ pack smoking history and quit< 15 year ago? Not indicated as she does not meet criteria.  -Last Dental: recommended follow up  -Last Eye exam: recommended follow up  -Last DEXA (65+F): n/a    Immunizations:  -Flu vaccine: not indicated   -Pneuomvax (PPSV23): not indicated  -Prevnar (PCV13): not indicated  -HPV: UTD   -Tdap: last given 2022  -Shingrix(50+): not indicated    Screenings:  -Hep C (1370-7603):   Lab Results   Component Value Date    HEPCAB NONREACTIVE 2023      -HIV(15-65):   Lab Results   Component Value Date    HIV1X2 Nonreactive 2024  "      -GC/CT: defers  -Syphilis: defers  -Lipid Panel (35M,45F):   Lab Results   Component Value Date    CHOL 169 06/27/2024    CHOL 127 11/01/2018     Lab Results   Component Value Date    HDL 55.1 06/27/2024    HDL 57.2 11/01/2018     Lab Results   Component Value Date    LDLCALC 90 06/27/2024     Lab Results   Component Value Date    TRIG 120 06/27/2024    TRIG 230 (H) 11/01/2018     -DM screening:   Lab Results   Component Value Date    HGBA1C 5.4 06/27/2024      -HTN screening:   -Vitamin D 25-OH: defers  -Depression: PHQ-2 negative  -CAGE: neg  -PHQ-2: neg    REVIEW OF SYSTEMS:  -No fevers, chills, weight is stable  -No sores, ulcers, rashes, skin lesions  -No HA, SZ, syncope, stroke, TIA  -No CP, chest pressure  -No cough, SOB  -No ABD pain, nausea, vomiting  -No urinary urgency, dysuria, urinary frequency  -No BRBPR, melena, hematuria  -No bleeding  -No edema, no calf pain  -Organ systems reviewed & negative, except as mentioned in HPI    PMH, PSH, SoHx, FamHx and Medication reviewed and edited as indicated.     Objective   /74 (BP Location: Right arm, Patient Position: Sitting)   Pulse 89   Temp 36.1 °C (97 °F) (Temporal)   Ht 1.651 m (5' 5\")   Wt 101 kg (223 lb)   SpO2 97%   BMI 37.11 kg/m²   Wt Readings from Last 6 Encounters:   05/30/25 101 kg (223 lb)   02/03/25 90.7 kg (200 lb)   10/08/24 90.7 kg (200 lb)   06/27/24 87.1 kg (192 lb)   03/14/24 87.1 kg (192 lb 1.6 oz)   03/10/24 86.2 kg (190 lb)       Physical Exam  Gen: NAD, nontox  HEENT: NCAT. MMM.  RESP: no resp distress, talks in full sentences, CTABL  CVS: non-tachycardic, RRR  ABD: Soft, non-distended  Psych: appropriately answers questions. normal mood and affect  MSK: appears to have Full range of motion on all extremities.     Assessment/Plan   28 yo F here for RHM. Pt would like antibody testing for school with MMR Ab as well as T spot. The pt has other concern of obesity. She states  that she has had good regimen in the past but " has gotten busy with school and child and has not had as much time to devote to exercising. The pt will be started on trulicity for obesity. Will order NRT for vaping. Also ordered immunity blood work.     Problem List Items Addressed This Visit    None  Visit Diagnoses         Vaping nicotine dependence, non-tobacco product    -  Primary    Relevant Medications    nicotine polacrilex (Nicorette) 4 mg gum    nicotine (Nicoderm CQ) 7 mg/24 hr patch    dulaglutide (Trulicity) 0.75 mg/0.5 mL pen injector      Health maintenance examination        Relevant Medications    dulaglutide (Trulicity) 0.75 mg/0.5 mL pen injector    Other Relevant Orders    T-Spot TB    Measles (Rubeola) Antibody, IgG    Mumps Antibody, IgG    Rubella antibody, IgG      Obesity (BMI 30-39.9)        Relevant Medications    dulaglutide (Trulicity) 0.75 mg/0.5 mL pen injector        Discussed with Dr. Baeza,    Eduin Daly MD, MPH   Family Medicine and Preventive Health, PGY-3

## 2025-06-03 ENCOUNTER — APPOINTMENT (OUTPATIENT)
Facility: HOSPITAL | Age: 30
End: 2025-06-03
Payer: COMMERCIAL

## 2025-06-04 NOTE — PROGRESS NOTES
I reviewed the resident/fellow's documentation and discussed the patient with the resident/fellow. I agree with the resident/fellow's medical decision making as documented in the note.    Kym Baeza MD

## 2025-06-06 ENCOUNTER — APPOINTMENT (OUTPATIENT)
Facility: HOSPITAL | Age: 30
End: 2025-06-06
Payer: COMMERCIAL

## 2025-07-17 LAB
MEV IGG SER IA-ACNC: 90 AU/ML
MUV IGG SER IA-ACNC: 41.9 AU/ML
RUBV IGG SERPL IA-ACNC: 2.66 INDEX

## 2025-07-19 LAB
IGNF NEG CNTRL BLD: NORMAL
M TB IFN-G BLD-IMP: NEGATIVE
MITOGEN IGNF.SPOT COUNT BLD: NORMAL
QUEST PANEL A SPOT COUNT: 0
QUEST PANEL B SPOT COUNT: 0